# Patient Record
Sex: MALE | Race: BLACK OR AFRICAN AMERICAN
[De-identification: names, ages, dates, MRNs, and addresses within clinical notes are randomized per-mention and may not be internally consistent; named-entity substitution may affect disease eponyms.]

---

## 2021-05-07 ENCOUNTER — HOSPITAL ENCOUNTER (EMERGENCY)
Dept: HOSPITAL 53 - M ED | Age: 21
Discharge: HOME | End: 2021-05-07
Payer: COMMERCIAL

## 2021-05-07 VITALS — BODY MASS INDEX: 29.62 KG/M2 | WEIGHT: 184.31 LBS | HEIGHT: 66 IN

## 2021-05-07 VITALS — DIASTOLIC BLOOD PRESSURE: 68 MMHG | SYSTOLIC BLOOD PRESSURE: 142 MMHG

## 2021-05-07 VITALS — OXYGEN SATURATION: 98 %

## 2021-05-07 DIAGNOSIS — R94.31: ICD-10-CM

## 2021-05-07 DIAGNOSIS — R07.1: ICD-10-CM

## 2021-05-07 DIAGNOSIS — F43.0: Primary | ICD-10-CM

## 2021-05-07 LAB
ALBUMIN SERPL BCG-MCNC: 4.5 GM/DL (ref 3.2–5.2)
ALT SERPL W P-5'-P-CCNC: 30 U/L (ref 12–78)
BASOPHILS # BLD AUTO: 0 10^3/UL (ref 0–0.2)
BASOPHILS NFR BLD AUTO: 0.2 % (ref 0–1)
BILIRUB CONJ SERPL-MCNC: 0.2 MG/DL (ref 0–0.2)
BILIRUB SERPL-MCNC: 0.7 MG/DL (ref 0.2–1)
BUN SERPL-MCNC: 15 MG/DL (ref 7–18)
CALCIUM SERPL-MCNC: 10.1 MG/DL (ref 8.5–10.1)
CHLORIDE SERPL-SCNC: 105 MEQ/L (ref 98–107)
CK MB CFR.DF SERPL CALC: 0.2
CK MB SERPL-MCNC: < 1 NG/ML (ref ?–3.6)
CK SERPL-CCNC: 499 U/L (ref 39–308)
CO2 SERPL-SCNC: 29 MEQ/L (ref 21–32)
CREAT SERPL-MCNC: 1.07 MG/DL (ref 0.7–1.3)
EOSINOPHIL # BLD AUTO: 0 10^3/UL (ref 0–0.5)
EOSINOPHIL NFR BLD AUTO: 0.1 % (ref 0–3)
GFR SERPL CREATININE-BSD FRML MDRD: > 60 ML/MIN/{1.73_M2} (ref 60–?)
GLUCOSE SERPL-MCNC: 79 MG/DL (ref 70–100)
HCT VFR BLD AUTO: 46.7 % (ref 42–52)
HGB BLD-MCNC: 15.4 G/DL (ref 13.5–17.5)
LYMPHOCYTES # BLD AUTO: 1.7 10^3/UL (ref 1.5–5)
LYMPHOCYTES NFR BLD AUTO: 17.3 % (ref 24–44)
MCH RBC QN AUTO: 28.8 PG (ref 27–33)
MCHC RBC AUTO-ENTMCNC: 33 G/DL (ref 32–36.5)
MCV RBC AUTO: 87.5 FL (ref 80–96)
MONOCYTES # BLD AUTO: 0.7 10^3/UL (ref 0–0.8)
MONOCYTES NFR BLD AUTO: 6.6 % (ref 2–8)
NEUTROPHILS # BLD AUTO: 7.4 10^3/UL (ref 1.5–8.5)
NEUTROPHILS NFR BLD AUTO: 75.5 % (ref 36–66)
PLATELET # BLD AUTO: 285 10^3/UL (ref 150–450)
POTASSIUM SERPL-SCNC: 4.4 MEQ/L (ref 3.5–5.1)
PROT SERPL-MCNC: 8.2 GM/DL (ref 6.4–8.2)
RBC # BLD AUTO: 5.34 10^6/UL (ref 4.3–6.1)
SODIUM SERPL-SCNC: 138 MEQ/L (ref 136–145)
TROPONIN I SERPL-MCNC: < 0.02 NG/ML (ref ?–0.1)
WBC # BLD AUTO: 9.8 10^3/UL (ref 4–10)

## 2021-05-07 NOTE — [facility]GE[facility]IP
[facility] - ED

                                       

                                       Test Date:    2021

Pat Name:     TANISHA SIMS              Department:   

Patient ID:   [id number]                 Room:         -

Gender:       Male                     Technician:   ENID

:          2000               Requested By: DONNELL RIVERA PA-C

Order Number: [id number]     Reading MD:   Selvin Reina

                                 Measurements

Intervals                              Axis          

Rate:         50                       P:            47

NM:           170                      QRS:          18

QRSD:         94                       T:            26

QT:           394                                    

QTc:          359                                    

                           Interpretive Statements

Sinus bradycardia

POOR R WAVE PROGRESSION

Early repolarization

NO PRIORS FOR COMPARISON

Electronically Signed on 2021 18:49:59 EDT by Selvin Reina

## 2021-05-07 NOTE — REP
INDICATION:

chest tightness.



COMPARISON:

No comparison chest x-ray.



TECHNIQUE:

Two views..



FINDINGS:

The lungs are well inflated and free of infiltrate.  The pleural angles are sharp.

The heart size is normal.  Pulmonary vasculature is not increased.  No significant

bony abnormality is seen.



IMPRESSION:

Negative chest x-ray.





<Electronically signed by Genaro Hansen > 05/07/21 0904